# Patient Record
Sex: MALE | Race: ASIAN | NOT HISPANIC OR LATINO
[De-identification: names, ages, dates, MRNs, and addresses within clinical notes are randomized per-mention and may not be internally consistent; named-entity substitution may affect disease eponyms.]

---

## 2017-03-14 ENCOUNTER — APPOINTMENT (OUTPATIENT)
Dept: UROLOGY | Facility: CLINIC | Age: 67
End: 2017-03-14

## 2017-06-09 ENCOUNTER — APPOINTMENT (OUTPATIENT)
Dept: UROLOGY | Facility: CLINIC | Age: 67
End: 2017-06-09

## 2017-06-09 VITALS
RESPIRATION RATE: 18 BRPM | OXYGEN SATURATION: 97 % | DIASTOLIC BLOOD PRESSURE: 72 MMHG | SYSTOLIC BLOOD PRESSURE: 115 MMHG | BODY MASS INDEX: 19.21 KG/M2 | HEART RATE: 54 BPM | WEIGHT: 119 LBS | TEMPERATURE: 98.3 F

## 2017-06-13 LAB
ANION GAP SERPL CALC-SCNC: 11 MMOL/L
BUN SERPL-MCNC: 21 MG/DL
CALCIUM SERPL-MCNC: 8.7 MG/DL
CHLORIDE SERPL-SCNC: 105 MMOL/L
CO2 SERPL-SCNC: 25 MMOL/L
CREAT SERPL-MCNC: 0.98 MG/DL
GLUCOSE SERPL-MCNC: 112 MG/DL
POTASSIUM SERPL-SCNC: 4.5 MMOL/L
PSA FREE FLD-MCNC: 27.7
PSA FREE SERPL-MCNC: 1.3 NG/ML
PSA SERPL-MCNC: 4.69 NG/ML
SODIUM SERPL-SCNC: 141 MMOL/L

## 2018-06-26 ENCOUNTER — APPOINTMENT (OUTPATIENT)
Dept: UROLOGY | Facility: CLINIC | Age: 68
End: 2018-06-26

## 2020-11-17 ENCOUNTER — APPOINTMENT (OUTPATIENT)
Dept: UROLOGY | Facility: CLINIC | Age: 70
End: 2020-11-17
Payer: MEDICARE

## 2020-11-17 VITALS
RESPIRATION RATE: 18 BRPM | OXYGEN SATURATION: 95 % | DIASTOLIC BLOOD PRESSURE: 64 MMHG | SYSTOLIC BLOOD PRESSURE: 104 MMHG | TEMPERATURE: 97.5 F | HEIGHT: 66 IN | WEIGHT: 129 LBS | HEART RATE: 64 BPM | BODY MASS INDEX: 20.73 KG/M2

## 2020-11-17 PROCEDURE — 99204 OFFICE O/P NEW MOD 45 MIN: CPT

## 2020-11-17 RX ORDER — ENEMA 19; 7 G/133ML; G/133ML
7-19 ENEMA RECTAL
Qty: 2 | Refills: 0 | Status: ACTIVE | COMMUNITY
Start: 2020-11-17 | End: 1900-01-01

## 2020-11-17 NOTE — ADDENDUM
[FreeTextEntry1] : Entered by Jesus León, acting as scribe for Dr. Artemio Simmons.\par \par The documentation recorded by the scribe accurately reflects the service I personally performed and the decisions made by me.\par

## 2020-11-17 NOTE — LETTER BODY
[FreeTextEntry1] : Susanna Mares MD\par [address] [place] [address]\[place] [address]\par P [phone]\par F [phone]\par \par Dear Dr. Mares,\par \par Reason for Visit: BPH. Elevated PSA\par \par This is a 70 year-old gentleman with elevated PSA and chronic BPH. Patient is here today for evaluation. He was last seen by me over 3 years ago. The patient had a negative prostate biopsy in 2014. Patient reports he has weak uroflow, frequency, and hesitancy. He denies any hematuria or urinary incontinence. His symptoms are aggravated by hydration. He denies any alleviating factors. He has tried Proscar and Flomax BID previously with symptom improvement while on the medications. He reports no pain. His recent PSA was 4.69. Patient reports he has had a previous prostate biopsy. All other review of systems are negative. He has no cancer in his family medical history. He has no previous surgical history. Past medical history, family history and social history were inquired and were noncontributory to current condition. The patient does not use tobacco or drink alcohol. Medications and allergies were reviewed. He has no known allergies to medication. \par \par On examination, the patient is a healthy-appearing gentleman in no acute distress. He is alert and oriented follows commands. He has normal mood and affect. He is normocephalic. Neck is supple. Oral no thrush Respirations are unlabored. His abdomen is soft and nontender. Bladder is nonpalpable. No CVA tenderness. Neurologically he is grossly intact. No peripheral edema. Skin without gross abnormality. He has normal male external genitalia. Normal meatus. Bilateral testes are descended intrascrotally and normal to palpation. On rectal examination, there is normal sphincter tone. The prostate is clinically benign without focal induration or nodularity.\par \par ASSESSMENT: BPH. Elevated PSA\par \par I counseled the patient on the various etiology of his symptoms. I discussed the natural history of BPH and the treatment options available. I discussed the options of conservative management with fluid in dietary restrictions, herbal therapy, medical therapy, and minimally invasive procedures. Risk and benefits were discussed. I answered his questions. I recommended he begin a trial of Flomax BID and Proscar. I discussed the potential side effects of the medications. I counseled the patient on its use and side effects. If the patient develops any side effects, the patient will discontinue the medications and contact me. The patient will obtain urinalysis and urine culture today to evaluate for infection. In terms of his elevated PSA, I discussed with him the risk of occult malignancy. I discussed the various etiologies of PSA elevation, including enlargement of prostate, inflammation or infection, and prostate cancer. Patient would like to confirm the diagnosis with repeating the PSA. I recommended he obtain prostate MRI for further evaluation. I counseled the patient regarding the procedure. The risks and benefits were discussed. Alternatives were given. I answered the patient questions. The patient will take the necessary preparations for the procedure, including fleet enema. If his PSA remains elevated, then he may then consider a prostate biopsy. He will also repeat BMP today to ensure stability. Risks and alternatives were discussed. I answered the patient questions. The patient will follow-up as directed and will contact me with any questions or concerns. Thank you for the opportunity to participate in the care of Mr. LEAL. I will keep you updated on his progress.\par \par Plan: Repeat PSA. Prostate MRI. Fleet enema. BMP. Urinalysis. Urine culture. Trial of Flomax BID and Proscar. Follow-up as directed.

## 2020-11-20 LAB
ANION GAP SERPL CALC-SCNC: 11 MMOL/L
APPEARANCE: CLEAR
BACTERIA UR CULT: NORMAL
BACTERIA: NEGATIVE
BILIRUBIN URINE: NEGATIVE
BLOOD URINE: NEGATIVE
BUN SERPL-MCNC: 14 MG/DL
CALCIUM SERPL-MCNC: 9.2 MG/DL
CHLORIDE SERPL-SCNC: 103 MMOL/L
CO2 SERPL-SCNC: 25 MMOL/L
COLOR: YELLOW
CREAT SERPL-MCNC: 0.87 MG/DL
GLUCOSE QUALITATIVE U: NEGATIVE
GLUCOSE SERPL-MCNC: 105 MG/DL
HYALINE CASTS: 0 /LPF
KETONES URINE: NEGATIVE
LEUKOCYTE ESTERASE URINE: NEGATIVE
MICROSCOPIC-UA: NORMAL
NITRITE URINE: NEGATIVE
PH URINE: 6
POTASSIUM SERPL-SCNC: 4.3 MMOL/L
PROTEIN URINE: NORMAL
PSA FREE FLD-MCNC: 30 %
PSA FREE FLD-MCNC: 31 %
PSA FREE SERPL-MCNC: 3.26 NG/ML
PSA FREE SERPL-MCNC: 3.37 NG/ML
PSA SERPL-MCNC: 10.7 NG/ML
PSA SERPL-MCNC: 10.8 NG/ML
RED BLOOD CELLS URINE: 3 /HPF
SODIUM SERPL-SCNC: 138 MMOL/L
SPECIFIC GRAVITY URINE: 1.02
SQUAMOUS EPITHELIAL CELLS: 1 /HPF
UROBILINOGEN URINE: NORMAL
WHITE BLOOD CELLS URINE: 1 /HPF

## 2020-12-01 RX ORDER — CEPHALEXIN 500 MG/1
500 TABLET ORAL 3 TIMES DAILY
Qty: 15 | Refills: 0 | Status: ACTIVE | COMMUNITY
Start: 2020-12-01 | End: 1900-01-01

## 2021-08-17 ENCOUNTER — APPOINTMENT (OUTPATIENT)
Dept: UROLOGY | Facility: CLINIC | Age: 71
End: 2021-08-17
Payer: MEDICARE

## 2021-08-17 VITALS
TEMPERATURE: 97.9 F | HEART RATE: 70 BPM | WEIGHT: 130 LBS | RESPIRATION RATE: 18 BRPM | BODY MASS INDEX: 20.98 KG/M2 | DIASTOLIC BLOOD PRESSURE: 66 MMHG | OXYGEN SATURATION: 96 % | SYSTOLIC BLOOD PRESSURE: 118 MMHG

## 2021-08-17 DIAGNOSIS — Z00.00 ENCOUNTER FOR GENERAL ADULT MEDICAL EXAMINATION W/OUT ABNORMAL FINDINGS: ICD-10-CM

## 2021-08-17 PROCEDURE — 99214 OFFICE O/P EST MOD 30 MIN: CPT

## 2021-08-17 PROCEDURE — 76775 US EXAM ABDO BACK WALL LIM: CPT

## 2021-08-17 NOTE — LETTER BODY
[FreeTextEntry1] : Susanna Mares MD\par [address] [place] [address]\[place] [address]\par P [phone]\par F [phone]\par \par Dear Dr. Mares,\par \par Reason for visit: BPH. Elevated PSA.  Right flank pain.\par \par This is a 71 year -year-old gentleman with elevated PSA and chronic BPH. Patient returns today for followup. Patient continues to take Flomax and Proscar. Patient reports taking the medications regularly without any side effects or difficulties with the medication. Patient had a prostate biopsy 7 years ago.  His MRI last year demonstrated PI-RADS 2.  He has a markedly enlarged prostate over 160 cc.  Patient does endorse mild right flank pain.  He denies any fevers or chills.  Patient reports stable urinary flow. Patient denies any urinary retention or hematuria or changes in health. Patient has no pain. The past medical history and family history and social history are unchanged. All other review of systems are negative. Patient denies any changes in medications. Medication list was reconciled.\par \par On examination, the patient is a healthy-appearing gentleman in no acute distress. He is alert and oriented follows commands. He has normal mood and affect. He is normocephalic. Oral no thrush. Neck is supple. Respirations are unlabored. His abdomen is soft and nontender. Liver is nonpalpable. Bladder is nonpalpable. No CVA tenderness. Neurologically he is grossly intact. No peripheral edema. Skin without gross abnormality. On rectal examination, he has a clinically benign prostate without induration or nodularity.\par \par I personally reviewed the prostate MRI with patient today.  MRI demonstrated enlarged prostate without focal lesions.  His prostate is over 160 cc.\par \par Assessment: BPH, symptoms improved with Flomax and Proscar. Elevated PSA. Right flank. \par \par I counseled the patient.  I renewed his prescription for Flomax and Proscar today. I encouraged him to continue medication.  In terms of elevated PSA, patient will repeat PSA today to ensure stability.  In terms of his right flank pain, patient obtain a renal ultrasound to rule out hydronephrosis.. Risks and alternatives were discussed. I answered the patient questions. The patient will follow-up as directed and will contact me with any questions or concerns. \par \par Plan: Followup 1 year. Continue Proscar and Flomax. PSA  Renal US.  PSA.

## 2021-08-19 LAB
ANION GAP SERPL CALC-SCNC: 12 MMOL/L
BUN SERPL-MCNC: 20 MG/DL
CALCIUM SERPL-MCNC: 8.9 MG/DL
CHLORIDE SERPL-SCNC: 105 MMOL/L
CO2 SERPL-SCNC: 24 MMOL/L
CREAT SERPL-MCNC: 0.9 MG/DL
GLUCOSE SERPL-MCNC: 102 MG/DL
POTASSIUM SERPL-SCNC: 4.1 MMOL/L
PSA FREE FLD-MCNC: 29 %
PSA FREE SERPL-MCNC: 2.42 NG/ML
PSA SERPL-MCNC: 8.35 NG/ML
SODIUM SERPL-SCNC: 140 MMOL/L

## 2022-08-16 ENCOUNTER — APPOINTMENT (OUTPATIENT)
Dept: UROLOGY | Facility: CLINIC | Age: 72
End: 2022-08-16

## 2024-03-12 ENCOUNTER — APPOINTMENT (OUTPATIENT)
Dept: UROLOGY | Facility: CLINIC | Age: 74
End: 2024-03-12
Payer: MEDICARE

## 2024-03-12 VITALS
HEART RATE: 66 BPM | OXYGEN SATURATION: 97 % | DIASTOLIC BLOOD PRESSURE: 70 MMHG | RESPIRATION RATE: 16 BRPM | WEIGHT: 125 LBS | SYSTOLIC BLOOD PRESSURE: 122 MMHG | BODY MASS INDEX: 20.18 KG/M2 | TEMPERATURE: 98 F

## 2024-03-12 DIAGNOSIS — R97.20 ELEVATED PROSTATE, SPECIFIC ANTIGEN [PSA]: ICD-10-CM

## 2024-03-12 DIAGNOSIS — N40.1 BENIGN PROSTATIC HYPERPLASIA WITH LOWER URINARY TRACT SYMPMS: ICD-10-CM

## 2024-03-12 PROCEDURE — 99214 OFFICE O/P EST MOD 30 MIN: CPT

## 2024-03-12 PROCEDURE — G2211 COMPLEX E/M VISIT ADD ON: CPT

## 2024-03-12 NOTE — ADDENDUM
[FreeTextEntry1] : Entered by Veda Ruggiero, acting as scribe for Dr. Artemio Simmons. The documentation recorded by the scribe accurately reflects the service I personally performed and the decisions made by me.

## 2024-03-12 NOTE — LETTER BODY
[FreeTextEntry1] : Susanna Mares MD  [address] [place] [address]  P [phone]  F [phone]    Dear Dr. Mares,    Reason for visit: BPH. Elevated PSA. Right flank pain.    This is a 74 year -year-old gentleman with elevated PSA and chronic BPH. Patient returns today for followup. Patient was last seen 3 years ago. Patient continues to take Flomax BID and Proscar. Patient reports taking the medications regularly without any side effects or difficulties with the medication. Patient had a previous prostate biopsy. His MRI last year demonstrated PI-RADS 2. He has a markedly enlarged prostate over 160 cc. Since he was last seen, patient's PSA improved. He denies any fevers or chills. Patient reports stable urinary flow. Patient denies any urinary retention or hematuria or changes in health. Patient has no pain. The past medical history and family history and social history are unchanged. All other review of systems are negative. Patient denies any changes in medications. Medication list was reconciled.    On examination, the patient is a healthy-appearing gentleman in no acute distress. He is alert and oriented follows commands. He has normal mood and affect. He is normocephalic. Oral no thrush. Neck is supple. Respirations are unlabored. His abdomen is soft and nontender. Liver is nonpalpable. Bladder is nonpalpable. No CVA tenderness. Neurologically he is grossly intact. No peripheral edema. Skin without gross abnormality. On rectal examination, he has a clinically benign prostate without induration or nodularity.   His recent PSA was 5.3, which improved.   Assessment: BPH, symptoms improved with Flomax and Proscar. Elevated PSA. Right flank.    I counseled the patient. In terms of his BPH, I renewed his prescription for Flomax BID and Proscar today. I encouraged him to continue medication. In terms of elevated PSA, his PSA improved. Patient will repeat PSA to ensure stability. Risks and alternatives were discussed. I answered the patient questions. The patient will follow-up as directed and will contact me with any questions or concerns.  Thank you for the opportunity to participate in the care of this patient. I'll keep you updated on his  progress.  Patient will continue longitudinal care for his complex and serious chronic condition.  Plan: Followup 1 year. Continue Proscar and Flomax. PSA.  I spent 30-minutes time today on all issues related to this patient on today date of service including non face to face time.

## 2024-12-06 ENCOUNTER — RX RENEWAL (OUTPATIENT)
Age: 74
End: 2024-12-06

## 2025-03-11 ENCOUNTER — APPOINTMENT (OUTPATIENT)
Dept: UROLOGY | Facility: CLINIC | Age: 75
End: 2025-03-11
Payer: MEDICARE

## 2025-03-11 VITALS
HEART RATE: 80 BPM | BODY MASS INDEX: 20.82 KG/M2 | TEMPERATURE: 97.2 F | DIASTOLIC BLOOD PRESSURE: 75 MMHG | OXYGEN SATURATION: 96 % | RESPIRATION RATE: 16 BRPM | WEIGHT: 129 LBS | SYSTOLIC BLOOD PRESSURE: 129 MMHG

## 2025-03-11 DIAGNOSIS — R97.20 ELEVATED PROSTATE, SPECIFIC ANTIGEN [PSA]: ICD-10-CM

## 2025-03-11 DIAGNOSIS — N40.1 BENIGN PROSTATIC HYPERPLASIA WITH LOWER URINARY TRACT SYMPMS: ICD-10-CM

## 2025-03-11 PROCEDURE — 99214 OFFICE O/P EST MOD 30 MIN: CPT

## 2025-03-11 PROCEDURE — G2211 COMPLEX E/M VISIT ADD ON: CPT

## 2025-03-12 LAB
ANION GAP SERPL CALC-SCNC: 11 MMOL/L
BUN SERPL-MCNC: 16 MG/DL
CALCIUM SERPL-MCNC: 8.6 MG/DL
CHLORIDE SERPL-SCNC: 105 MMOL/L
CO2 SERPL-SCNC: 24 MMOL/L
CREAT SERPL-MCNC: 1.03 MG/DL
EGFRCR SERPLBLD CKD-EPI 2021: 77 ML/MIN/1.73M2
GLUCOSE SERPL-MCNC: 103 MG/DL
POTASSIUM SERPL-SCNC: 4.4 MMOL/L
PSA FREE FLD-MCNC: 30 %
PSA FREE SERPL-MCNC: 1.09 NG/ML
PSA SERPL-MCNC: 3.61 NG/ML
SODIUM SERPL-SCNC: 140 MMOL/L